# Patient Record
Sex: FEMALE | Race: BLACK OR AFRICAN AMERICAN
[De-identification: names, ages, dates, MRNs, and addresses within clinical notes are randomized per-mention and may not be internally consistent; named-entity substitution may affect disease eponyms.]

---

## 2018-05-12 ENCOUNTER — HOSPITAL ENCOUNTER (EMERGENCY)
Dept: HOSPITAL 74 - JER | Age: 23
Discharge: HOME | End: 2018-05-12
Payer: COMMERCIAL

## 2018-05-12 VITALS — TEMPERATURE: 97.5 F | HEART RATE: 57 BPM | SYSTOLIC BLOOD PRESSURE: 116 MMHG | DIASTOLIC BLOOD PRESSURE: 78 MMHG

## 2018-05-12 VITALS — BODY MASS INDEX: 37.3 KG/M2

## 2018-05-12 DIAGNOSIS — N73.8: Primary | ICD-10-CM

## 2018-05-12 LAB
ALBUMIN SERPL-MCNC: 4 G/DL (ref 3.4–5)
ALP SERPL-CCNC: 100 U/L (ref 45–117)
ALT SERPL-CCNC: 15 U/L (ref 12–78)
ANION GAP SERPL CALC-SCNC: 7 MMOL/L (ref 8–16)
APPEARANCE UR: CLEAR
AST SERPL-CCNC: 9 U/L (ref 15–37)
BASOPHILS # BLD: 0.4 % (ref 0–2)
BILIRUB SERPL-MCNC: 0.2 MG/DL (ref 0.2–1)
BILIRUB UR STRIP.AUTO-MCNC: NEGATIVE MG/DL
BUN SERPL-MCNC: 12 MG/DL (ref 7–18)
CALCIUM SERPL-MCNC: 8.9 MG/DL (ref 8.5–10.1)
CHLORIDE SERPL-SCNC: 106 MMOL/L (ref 98–107)
CO2 SERPL-SCNC: 27 MMOL/L (ref 21–32)
COLOR UR: (no result)
CREAT SERPL-MCNC: 0.9 MG/DL (ref 0.55–1.02)
DEPRECATED RDW RBC AUTO: 14.4 % (ref 11.6–15.6)
EOSINOPHIL # BLD: 3.5 % (ref 0–4.5)
EPITH CASTS URNS QL MICRO: (no result) /HPF
GLUCOSE SERPL-MCNC: 95 MG/DL (ref 74–106)
HCT VFR BLD CALC: 35.9 % (ref 32.4–45.2)
HGB BLD-MCNC: 12.3 GM/DL (ref 10.7–15.3)
KETONES UR QL STRIP: NEGATIVE
LEUKOCYTE ESTERASE UR QL STRIP.AUTO: NEGATIVE
LIPASE SERPL-CCNC: 136 U/L (ref 73–393)
LYMPHOCYTES # BLD: 29.7 % (ref 8–40)
MCH RBC QN AUTO: 28.6 PG (ref 25.7–33.7)
MCHC RBC AUTO-ENTMCNC: 34.2 G/DL (ref 32–36)
MCV RBC: 83.6 FL (ref 80–96)
MONOCYTES # BLD AUTO: 7.8 % (ref 3.8–10.2)
MUCOUS THREADS URNS QL MICRO: (no result)
NEUTROPHILS # BLD: 58.6 % (ref 42.8–82.8)
NITRITE UR QL STRIP: NEGATIVE
PH UR: 6 [PH] (ref 5–8)
PLATELET # BLD AUTO: 315 K/MM3 (ref 134–434)
PMV BLD: 9.1 FL (ref 7.5–11.1)
POTASSIUM SERPLBLD-SCNC: 4 MMOL/L (ref 3.5–5.1)
PROT SERPL-MCNC: 8.1 G/DL (ref 6.4–8.2)
PROT UR QL STRIP: NEGATIVE
PROT UR QL STRIP: NEGATIVE
RBC # BLD AUTO: 4.29 M/MM3 (ref 3.6–5.2)
SODIUM SERPL-SCNC: 140 MMOL/L (ref 136–145)
SP GR UR: 1.02 (ref 1–1.03)
UROBILINOGEN UR STRIP-MCNC: 2 MG/DL (ref 0.2–1)
WBC # BLD AUTO: 9.7 K/MM3 (ref 4–10)

## 2018-05-12 PROCEDURE — 3E02329 INTRODUCTION OF OTHER ANTI-INFECTIVE INTO MUSCLE, PERCUTANEOUS APPROACH: ICD-10-PCS | Performed by: EMERGENCY MEDICINE

## 2018-05-12 PROCEDURE — 3E033GC INTRODUCTION OF OTHER THERAPEUTIC SUBSTANCE INTO PERIPHERAL VEIN, PERCUTANEOUS APPROACH: ICD-10-PCS | Performed by: EMERGENCY MEDICINE

## 2018-05-12 NOTE — EKG
Test Reason : 

Blood Pressure : ***/*** mmHG

Vent. Rate : 066 BPM     Atrial Rate : 066 BPM

   P-R Int : 140 ms          QRS Dur : 080 ms

    QT Int : 398 ms       P-R-T Axes : 044 013 030 degrees

   QTc Int : 417 ms

 

SINUS RHYTHM WITH MARKED SINUS ARRHYTHMIA

OTHERWISE NORMAL ECG

WHEN COMPARED WITH ECG OF 01-DEC-2007 18:26,

PREVIOUS ECG IS PRESENT

Confirmed by MARIO CAPPS, QUINTIN ([id number]) on 5/12/2018 3:35:41 PM

 

Referred By:             Confirmed By:QUINTIN MARTIN MD

## 2018-05-12 NOTE — PDOC
Attending Attestation





- Resident


Resident Name: Gilles Park





- ED Attending Attestation


I have performed the following: I have examined & evaluated the patient, The 

case was reviewed & discussed with the resident, I agree w/resident's findings 

& plan, Exceptions are as noted





- HPI


HPI: 





05/12/18 12:48


"The patient is a 22 year old female, with a significant past medical history 

of asthma, who presents to the emergency department with, nausea, abdominal pain

, and headache. She describes her abdominal pain as going on for the past two 

weeks, initially constant and now intermittent in nature, localized to the 

periumbilical region with associated nausea without emesis. She reports her 

abdominal pain to worsen when eating spicy food and drinking milk. She reports 

3 days of bitemporal headache. She reports taking Ibuprofen, without relief.  

She believes her headaches is due to working the past 3 overnights. Pt denies 

thunderclap, denies worst headache of life, denies neck stiffness, denies F/C.





She denies recent fevers, chills, or dizziness. She denies recent vomit, 

diarrhea or constipation. She denies recent  dysuria, frequency, urgency or 

hematuria. She denies recent chest pain or shortness of breath.





Allergies: NKA 


Past surgical history: None reported.


Social history: Nonsmoker. Denies EtOH use and recreational drug use. 


Primary Care Physician: Dr. Gayathri No


"





- Physicial Exam


PE: 





05/12/18 12:49


"GENERAL: Awake, alert, and fully oriented, in no acute distress.


HEAD: No signs of trauma


EYES: PERRLA, EOMI, sclera anicteric, conjunctiva clear


ENT: Auricles normal inspection, hearing grossly normal, nares patent, 

oropharynx clear without exudates. Moist mucosa


NECK: Nontender, no stepoffs, Normal ROM, supple, no lymphadenopathy, JVD, or 

masses


LUNGS: Breath sounds equal, clear to auscultation bilaterally.  No wheezes, and 

no crackles


HEART: Regular rate and rhythm, normal S1 and S2, no murmurs, rubs or gallops


ABDOMEN: + RLQ TTP, normoactive bowel sounds.  No guarding, no rebound.  No 

masses


EXTREMITIES: Normal range of motion, no edema.  No clubbing or cyanosis. No 

cords, erythema, or tenderness


NEUROLOGICAL: Cranial nerves II through XII intact. 5/5 strength and sensation 

in all extremities, Normal speech, normal gait, normal cerebellar function


SKIN: Warm, Dry, normal turgor, no rashes or lesions noted.


: + CMT, no vaginal discharge or bleeding, os closed, no adnexal masses





- Medical Decision Making





05/12/18 12:50


22 F with abdominal pain and headache. Pt with + RLQ tenderness and + CMT. 

Concerning for PID, will tx empirically with ceftriaxone/doxy. Will also need 

to r/o acute appy.


- Labs, UA, UPT


- CTAP


- Ceftriaxone + doxy





05/12/18 13:50


Labs, CT unremarkable. No ovarian masses or cysts to suggest torsion as 

etiology of pt's pain.


Pt reassessed - states she feels much better. Headache has resolved completely 

with meds.


Abdominal exam with no tenderness at this time.


Pt is well appearing, with normal vitals. Clinically stable for DC at this time.


I discussed the physical exam findings, ancillary test results and final 

diagnoses with the patient. I answered all of the patient's questions. The 

patient was satisfied with the care received and felt comfortable with the 

discharge plan and treatment plan.  The patient agrees to follow up with the 

primary care physician within 24-72 hours.





<Stephen Pierce - Last Filed: 05/13/18 09:34>





Attestations





- Attestations





05/12/18 17:54





Documentation prepared by Ryan Osborne, acting as medical scribe for Stephen Pierce MD.





<Ryan Osborne - Last Filed: 05/12/18 17:54>

## 2018-05-12 NOTE — PDOC
History of Present Illness





- General


Chief Complaint: Headache


Stated Complaint: NAUSEA, HEADACHE


Time Seen by Provider: 05/12/18 09:51


History Source: Patient


Exam Limitations: No Limitations





- History of Present Illness


Initial Comments: 





05/12/18 10:12


The patient is a 22F with a PMH of asthma who presents to the ER complaining of 

nausea, abdominal pain, and headache. The patient states that she's had 

constant (possibly intermittent) nausea x 2 weeks. She believes that she might 

have caught a "stomach bug" from her son who had nausea, vomited once, and felt 

better. The patient denies any vomiting. She also complains of sharp 

periumbilical abdominal pain, intermittent, worse with milk and and spicy foods

, and not alleviated by anything. The patient also complains about a headache 

that is constant, 3 days, bitemporal, and not exacerbated or alleviated by 

anything. Of note, she states that she has worked the past 3 overnights 








Past History





- Past Medical History


Allergies/Adverse Reactions: 


 Allergies











Allergy/AdvReac Type Severity Reaction Status Date / Time


 


No Known Allergies Allergy   Verified 05/12/18 09:29











Home Medications: 


Ambulatory Orders





Doxycycline Hyclate 100 mg PO BID #13 tablet 05/12/18 


Ondansetron [Zofran *Odt*] 8 mg SL TID #20 od.tablet 05/12/18 


Polyethylene Glycol 3350 [Miralax (For Daily Use) -] 17 gm PO DAILY #1 bottle 05 /12/18 








COPD: No


Other medical history: syncope





- Immunization History


Immunization Up to Date: Yes





- Suicide/Smoking/Psychosocial Hx


Smoking History: Never smoked


Have you smoked in the past 12 months: No


Information on smoking cessation initiated: No


Hx Alcohol Use: No


Substance Use Type: None





**Review of Systems





- Review of Systems


Able to Perform ROS?: Yes


Comments:: 





05/12/18 10:59


GENERAL/CONSTITUTIONAL: No fever or chills. No weakness.


HEAD, EYES, EARS, NOSE AND THROAT: No change in vision. No ear pain or 

discharge. No sore throat.


CARDIOVASCULAR: No chest pain, palpitations, or lightheadedness.


RESPIRATORY: No cough, wheezing, shortness of breath, or hemoptysis.


GASTROINTESTINAL: Positive for nausea and abdominal pain. No vomiting, diarrhea

, or constipation. 


GENITOURINARY: No dysuria, frequency, hematuria, or change in urination.


MUSCULOSKELETAL: No joint or muscle swelling or pain. No neck or back pain.


SKIN: No rash or lesions. 


NEUROLOGIC: Positive for headache. No numbness, tingling, weakness, loss of 

consciousness, or change in strength/sensation.


ENDOCRINE: No increased thirst. No abnormal weight change.


HEMATOLOGIC/LYMPHATIC: No anemia, easy bleeding, or history of blood clots.


ALLERGIC/IMMUNOLOGIC: No hives or skin allergy.


Is the patient limited English proficient: No





*Physical Exam





- Vital Signs


 Last Vital Signs











Temp Pulse Resp BP Pulse Ox


 


 97.5 F L  57 L  18   116/78   100 


 


 05/12/18 09:29  05/12/18 09:29  05/12/18 09:29  05/12/18 09:29  05/12/18 09:29














- Physical Exam


Comments: 





05/12/18 11:00


GENERAL: Well developed, well nourished. Awake and alert. No acute distress.


HEENT: Normocephalic, atraumatic. Hearing grossly normal. Moist mucous 

membranes. PERRLA, EOMI. No conjunctival pallor. Sclera are non-icteric. 


NECK: Supple. Full ROM. 


CARDIOVASCULAR: Regular rate and rhythm. No murmurs, rubs, or gallops. 


PULMONARY: No evidence of respiratory distress. Lungs clear to auscultation 

bilaterally. No wheezing, rales or rhonchi.


ABDOMINAL: Soft. Tenderness to deep palpation in b/l lower quadrants. Non-

distended. No rebound or guarding. 


GENITOURINARY: No CVA tenderness bilaterally.


PELVIC: Normal external genitalia. B/l adnexal tenderness with mild CMT.


MUSCULOSKELETAL: Normal range of motion at all joints. No bony deformities or 

tenderness. 


EXTREMITIES: No cyanosis. No clubbing. No edema. No calf tenderness.


SKIN: Warm and dry. Normal capillary refill. No rashes. No jaundice. 


NEUROLOGICAL: Alert, awake, appropriate. Cranial nerves 2-12 intact. Normal 

speech. Gait is normal without ataxia.


PSYCHIATRIC: Cooperative. Good eye contact. Appropriate mood and affect.








ED Treatment Course





- LABORATORY


CBC & Chemistry Diagram: 


 05/12/18 11:00





 05/12/18 11:00





Medical Decision Making





- Medical Decision Making





05/12/18 11:02


The patient is a 22F with a PMH of asthma who presents with multiple complaints 

including abdominal pain, nausea, and headache. Pelvic exam reveals diffuse 

tenderness. Will order CTAP with contrast. Pending labs and imaging. Will also 

treat for PID but I am concerned for appendicitis as well.





05/12/18 11:59


CBC, CMP, and UA negative. Pending CTAP.





05/12/18 13:32


CTAP Read:


No definite CT findings of acute pathology are identified. A small 1.2 x 0.2 cm 

right renal intracortical elliptical shaped focus is seen probably representing 

scarring as a result of previous inflammation/infection and less likely 

representing acute pyelonephritis. Correlate clinically. A nonspecific 2 x 2 x 

1.7 cm mildly irregular soft tissue nodule is noted within the anterior pelvic 

wall in a slightly left paramedian position 5 to 6 cm below the level umbilicus 

- ? desmoid tumor (versus other less common lesions such as nerve sheath tumor, 

sarcoma, metastatic disease). No prior CT/MRI studies are available at this 

facility for direct comparison. An IUD is noted in place which appears somewhat 

low in position within the uterine cavity. GYN consultation is suggested. 





Will discuss findings with pt and encourage GYN and PCP f/u.








*DC/Admit/Observation/Transfer


Diagnosis at time of Disposition: 


Abdominal pain


Qualifiers:


 Abdominal location: periumbilical Qualified Code(s): R10.33 - Periumbilical 

pain








- Discharge Dispostion


Disposition: HOME


Condition at time of disposition: Stable


Decision to Admit order: No





- Prescriptions


Prescriptions: 


Doxycycline Hyclate 100 mg PO BID #13 tablet


Ondansetron [Zofran *Odt*] 8 mg SL TID #20 od.tablet


Polyethylene Glycol 3350 [Miralax (For Daily Use) -] 17 gm PO DAILY #1 bottle





- Referrals


Referrals: 


Gayathri No [Primary Care Provider] - 





- Patient Instructions


Printed Discharge Instructions:  [facility] for Abdominal Pain-Adult


Additional Instructions: 


Please follow up with your primary care physician in 2-3 days. Also follow up 

with your GYN next week.





Please return to the ER if you have any signs or symptoms of chest pain, 

shortness of breath, uncontrollable fever, chills, nausea, vomiting, numbness, 

tingling, or weakness in any part of your body, changes in vision, or slurred 

speech.





Please take your medications as prescribed. Use the zofran as needed for nausea.





Please return to the ER if symptoms persist, worsen, or new symptoms arise.





Your IUD was noted to be a little low in your uterus. Please let your GYN know 

about this during your visit to adjust it.








- Post Discharge Activity


Forms/Work/School Notes:  Back to Work